# Patient Record
Sex: FEMALE | Race: WHITE | URBAN - METROPOLITAN AREA
[De-identification: names, ages, dates, MRNs, and addresses within clinical notes are randomized per-mention and may not be internally consistent; named-entity substitution may affect disease eponyms.]

---

## 2024-12-04 ENCOUNTER — HOSPITAL ENCOUNTER (OUTPATIENT)
Dept: RADIOLOGY | Facility: HOSPITAL | Age: 64
Discharge: HOME | End: 2024-12-04
Payer: COMMERCIAL

## 2024-12-04 ENCOUNTER — OFFICE VISIT (OUTPATIENT)
Dept: URGENT CARE | Age: 64
End: 2024-12-04
Payer: COMMERCIAL

## 2024-12-04 VITALS
SYSTOLIC BLOOD PRESSURE: 166 MMHG | DIASTOLIC BLOOD PRESSURE: 82 MMHG | RESPIRATION RATE: 18 BRPM | OXYGEN SATURATION: 96 % | HEIGHT: 63 IN | HEART RATE: 94 BPM | TEMPERATURE: 97.5 F | WEIGHT: 162 LBS | BODY MASS INDEX: 28.7 KG/M2

## 2024-12-04 DIAGNOSIS — M79.671 RIGHT FOOT PAIN: Primary | ICD-10-CM

## 2024-12-04 DIAGNOSIS — M79.671 RIGHT FOOT PAIN: ICD-10-CM

## 2024-12-04 PROCEDURE — 73610 X-RAY EXAM OF ANKLE: CPT | Mod: RT

## 2024-12-04 PROCEDURE — 73610 X-RAY EXAM OF ANKLE: CPT | Mod: RIGHT SIDE | Performed by: RADIOLOGY

## 2024-12-04 PROCEDURE — 73630 X-RAY EXAM OF FOOT: CPT | Mod: RIGHT SIDE | Performed by: RADIOLOGY

## 2024-12-04 PROCEDURE — 73630 X-RAY EXAM OF FOOT: CPT | Mod: RT

## 2024-12-04 ASSESSMENT — ENCOUNTER SYMPTOMS
JOINT SWELLING: 1
ARTHRALGIAS: 1
MYALGIAS: 1

## 2024-12-04 NOTE — PATIENT INSTRUCTIONS
Boot till follow up with ortho  Please call ortho referral  Walk in clinic   Ibuprofen for pain  If worsening, please go to ER    Please follow up with your primary provider within one week if symptoms do not improve.  You may schedule an appointment online at [email or web address] or call [phone]. Go to the Emergency Department if symptoms significantly worsen or if you develop chest pain or shortness of breath.

## 2024-12-04 NOTE — PROGRESS NOTES
"Subjective   Patient ID: Maria Guadalupe Jacques is a 64 y.o. female. They present today with a chief complaint of Injury (Fell down the stairs last night and hurt rt foot).    History of Present Illness  Maria Guadalupe Jacques is a 64 y.o. female who presents to the clinic for right foot/ankle pain after missing a step last night pt was walking down the steps and missed a step. Pt states her foot rolled underneath her. Pt denies any chest pain, sob, N/V at this time in clinic.         Injury  Associated symptoms: myalgias        Past Medical History  Allergies as of 12/04/2024    (No Known Allergies)       (Not in a hospital admission)       No past medical history on file.    No past surgical history on file.         Review of Systems  Review of Systems   Musculoskeletal:  Positive for arthralgias, joint swelling and myalgias.        Right foot, ankle     All other systems reviewed and are negative.                                 Objective    Vitals:    12/04/24 1419   BP: 166/82   BP Location: Left arm   Patient Position: Sitting   Pulse: 94   Resp: 18   Temp: 36.4 °C (97.5 °F)   SpO2: 96%   Weight: 73.5 kg (162 lb)   Height: 1.588 m (5' 2.5\")     No LMP recorded.    Physical Exam  Constitutional:       Appearance: Normal appearance.   Musculoskeletal:      Right ankle: Tenderness present over the lateral malleolus.      Right foot: Decreased range of motion. Tenderness and bony tenderness present.        Feet:       Comments: Ecchymosis noted to dorsal right foot.  Distal right toe sensation intact  Distal right toe cap refill < 2 seconds.    Neurological:      General: No focal deficit present.      Mental Status: She is alert and oriented to person, place, and time. Mental status is at baseline.   Psychiatric:         Mood and Affect: Mood normal.         Behavior: Behavior normal.         Procedures    Point of Care Test & Imaging Results from this visit  No results found for this visit on 12/04/24.   XR ankle right 3+ " views    Result Date: 12/4/2024  STUDY: Right foot and ankle radiographs; 12/4/2024, 1558 INDICATION: Pain. COMPARISON: None Available. ACCESSION NUMBER(S): [id number], [id number] ORDERING CLINICIAN: TECHNIQUE:  3 views of the right foot and 3 views of the right ankle. FINDINGS:  Right Foot:  There is focal cortical irregularity and small peripheral linear radiolucencies involving the medial and posterior aspect of the right navicular bone which is best appreciated on the AP projection. This finding may reflect a fracture of indeterminate age possibly subacute or old.  Correlate clinically.  The alignment is anatomic. No soft tissue abnormality is seen. Right Ankle:  There is no displaced fracture.  The alignment is anatomic.  No soft tissue abnormality is seen.    Right Foot:  Focal cortical irregularity and mild focal osseous deformity involving the medial and posterior aspect of the right navicular bone which may reflect a fracture of indeterminate age. Correlate clinically. Right Ankle:  No acute fracture or subluxation.  Signed by Mele Fraga MD    XR foot right 3+ views    Result Date: 12/4/2024  STUDY: Right foot and ankle radiographs; 12/4/2024, 1558 INDICATION: Pain. COMPARISON: None Available. ACCESSION NUMBER(S): [id number], [id number] ORDERING CLINICIAN: TECHNIQUE:  3 views of the right foot and 3 views of the right ankle. FINDINGS:  Right Foot:  There is focal cortical irregularity and small peripheral linear radiolucencies involving the medial and posterior aspect of the right navicular bone which is best appreciated on the AP projection. This finding may reflect a fracture of indeterminate age possibly subacute or old.  Correlate clinically.  The alignment is anatomic. No soft tissue abnormality is seen. Right Ankle:  There is no displaced fracture.  The alignment is anatomic.  No soft tissue abnormality is seen.    Right Foot:  Focal cortical irregularity and mild focal osseous deformity  involving the medial and posterior aspect of the right navicular bone which may reflect a fracture of indeterminate age. Correlate clinically. Right Ankle:  No acute fracture or subluxation.  Signed by Mele Fraga MD     Diagnostic study results (if any) were reviewed by AMY Wright.    Assessment/Plan   Allergies, medications, history, and pertinent labs/EKGs/Imaging reviewed by AMY Wright.     Medical Decision Making  History, examination, and XRAY consistent with fracture. Neurovascular status is intact. Fracture does not need to be reduced at this time.  Patient will be placed in boot and referred to Ortho. Patient advised to present in ED if symptoms change or worsen.        Orders and Diagnoses  Diagnoses and all orders for this visit:  Right foot pain  -     XR foot right 3+ views; Future  -     XR ankle right 3+ views; Future  -     Referral to Orthopaedic Surgery; Future      Medical Admin Record      Patient disposition: Home    Electronically signed by AMY Wright  5:08 PM

## 2024-12-16 ENCOUNTER — OFFICE VISIT (OUTPATIENT)
Dept: ORTHOPEDIC SURGERY | Facility: CLINIC | Age: 64
End: 2024-12-16
Payer: COMMERCIAL

## 2024-12-16 ENCOUNTER — HOSPITAL ENCOUNTER (OUTPATIENT)
Dept: RADIOLOGY | Facility: CLINIC | Age: 64
Discharge: HOME | End: 2024-12-16
Payer: COMMERCIAL

## 2024-12-16 DIAGNOSIS — M79.671 RIGHT FOOT PAIN: Primary | ICD-10-CM

## 2024-12-16 DIAGNOSIS — M79.671 RIGHT FOOT PAIN: ICD-10-CM

## 2024-12-16 PROCEDURE — 73630 X-RAY EXAM OF FOOT: CPT | Mod: RIGHT SIDE | Performed by: RADIOLOGY

## 2024-12-16 PROCEDURE — 99204 OFFICE O/P NEW MOD 45 MIN: CPT

## 2024-12-16 PROCEDURE — 99214 OFFICE O/P EST MOD 30 MIN: CPT

## 2024-12-16 PROCEDURE — 73630 X-RAY EXAM OF FOOT: CPT | Mod: RT

## 2024-12-16 RX ORDER — MELOXICAM 15 MG/1
15 TABLET ORAL DAILY
Qty: 30 TABLET | Refills: 0 | Status: SHIPPED | OUTPATIENT
Start: 2024-12-16 | End: 2025-01-15

## 2024-12-16 ASSESSMENT — PAIN - FUNCTIONAL ASSESSMENT: PAIN_FUNCTIONAL_ASSESSMENT: 0-10

## 2024-12-16 ASSESSMENT — PAIN DESCRIPTION - DESCRIPTORS: DESCRIPTORS: DISCOMFORT

## 2024-12-16 ASSESSMENT — PAIN SCALES - GENERAL: PAINLEVEL_OUTOF10: 2

## 2024-12-16 NOTE — PROGRESS NOTES
History of Present Illness  Patient presents for evaluation of side: right lower extremity pain after  she missed a step on the stair and turned her ankle and landed on the right foot . The patient sustained an acute injury on  12/3 . She went and saw the urgent care who placed her in the boot and said that it might be broken. The patient denies any loss of consciousness or additional significant injuries. The pain has improved since the injury. States that it is a dull ache at the inside of her foot and top of the foot.  The patient denies any current numbness, tinging, f/c, CP, SOB, or any other complaints/concerns.      History reviewed. No pertinent past medical history.    Medication Documentation Review Audit       Reviewed by Laura Doherty on 12/16/24 at 0939      Medication Order Taking? Sig Documenting Provider Last Dose Status            No Medications to Display                                   No Known Allergies    Social History     Socioeconomic History    Marital status: Unknown     Spouse name: Not on file    Number of children: Not on file    Years of education: Not on file    Highest education level: Not on file   Occupational History    Not on file   Tobacco Use    Smoking status: Not on file    Smokeless tobacco: Not on file   Substance and Sexual Activity    Alcohol use: Not on file    Drug use: Not on file    Sexual activity: Not on file   Other Topics Concern    Not on file   Social History Narrative    Not on file     Social Drivers of Health     Financial Resource Strain: Not on file   Food Insecurity: Not on file   Transportation Needs: Not on file   Physical Activity: Not on file   Stress: Not on file   Social Connections: Not on file   Intimate Partner Violence: Not on file   Housing Stability: Not on file       History reviewed. No pertinent surgical history.     Review of Systems   30 point ROS reviewed and negative other than as listed in the HPI.      Exam  Gen: The pt is A&Ox3,  NAD, and appear state age and weight  Psychiatric: mood and affect are appropriate   Eyes: sclera are white, EOM grossly intact  ENT: MMM  Neck: supple, thyroid is midline  Respiratory: respirations are nonlabored, chest rise symmetric  CV: rate is regular by palpation of distal pulses  Abdomen: nondistended   Integument: no obvious cutaneous lesions noted. No signs of lymphangitis. No signs of systemic edema.   MSK:    side: right lower extremity :  Skin healthy and intact  Swelling noted   Tenderness: mild at the talonavicular joint and navicular bone  Intact flexion and extension of digits  Neurovascular exam normal distally  2+ dorsalis pedis pulse and good cap refill     Imaging:  I personally reviewed multiple views of the right foot were obtained in the office today demonstrate talonavicular osteoarthritis and accessory navicular.      Assessment   Right talonavicular osteoarthritis and accessory navicular    Plan: Patient presents for right foot pain after missing a step on the stairs on 12/3. Patient is currently in a boot and states that her pain has improved. She is mild at the talonavicular joint and navicular bone. On imaging, demonstrated right talonavicular osteoarthritis and accessory navicular. Discussed with patient that she does not have a fracture of the foot, but a smooth two part navicular bone. This can become tender and sore, especially if patient has flat feet. As well, she has osteoarthritis of the talonavicular joint. She is educated that both of these are treated conservatively with over the counter anti-inflammatories and ice. She is given a prescription of meloxicam 15 mg to take once every day for 30 days. If her pain persists after the 30 days, she is educated to see a foot and ankle specialist, specifically Dr. Lomeli. Patient may discontinue the boot at this time. Patient is in agreement with this plan.     Natural history reviewed. All of the pt questions/concerns addressed and  they are in agreement with the plan.